# Patient Record
Sex: FEMALE
[De-identification: names, ages, dates, MRNs, and addresses within clinical notes are randomized per-mention and may not be internally consistent; named-entity substitution may affect disease eponyms.]

---
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
## 2019-01-29 NOTE — PATIENT PROFILE OB - BELONGINGS, PROFILE
My findings and recommendations are based on patient's symptoms, eye exam, diagnostic testing, and records. glasses/shoes/cell phone/clothing

## 2020-11-27 NOTE — DISCHARGE NOTE OB - BREAST MILK PROVIDES PROTECTION AGAINST INFECTION
-- DO NOT REPLY / DO NOT REPLY ALL --  -- Message is from the [facility]--    Provider paged via PacketVideo Documentation - The below message was copied and pasted from a batterii page:    Initiated Date/Time 11/27/2020 4:33 pm   Message Sent Date/Time 11/27/2020 4:37 pm   Source [facility] Contact [facility]   Department [facility]   Method Secure Text   Contacted Ban Martinez MD   Details Patient Patient seen by Specialist   Message   [phone] [facility] URGENT CALLER NAME: RUPALI PAULSON RE: BIANCA LORENE PATIENT 2012 PATIENT PCP: DR AMY HINES PATIENT MOTHER CALLED IN STATED THAT THE PATIENT INSULIN PUMP IS OFF AND NOW THEY ARE GIVING HER SHOTS. PATIENT MOTHER WANTED TO KNOW HOW MUCH LANTUS DOES SHE GIVE TO THE PATIENT TONIGHT AND REQUEST A CALL BACK TO ASSIST        Statement Selected
